# Patient Record
(demographics unavailable — no encounter records)

---

## 2025-03-23 NOTE — SIGNATURES
[TextEntry] : This note was written by Danyelle Hewitt on 03/21/2025 actively solely OWEN Diop M.D 03/21/2025. All medical record entries made by this scribe were at my  OWEN Diop M.D  direction and personally dictated by me on 03/21/2025. I have personally reviewed my chart and agree that the record reflects my personal performance of the history, physical exam, assessment, and plan.

## 2025-03-23 NOTE — HISTORY OF PRESENT ILLNESS
[FreeTextEntry1] : Pt is a 42 year old female who presents today for a follow-up visit. She c/o UTI sxs and abnormal discharge. Pt has a h/o acute UTI and yeast infections. No other gyn co.

## 2025-03-23 NOTE — PLAN
[FreeTextEntry1] : Acute UTI Urine culture sent Rx for Macrobid sent  Vaginal discharge  Rx for fluconazole sent

## 2025-03-23 NOTE — PHYSICAL EXAM
[Appropriately responsive] : appropriately responsive [Alert] : alert [No Acute Distress] : no acute distress [No Lymphadenopathy] : no lymphadenopathy [Soft] : soft [Non-distended] : non-distended [Non-tender] : non-tender [No HSM] : No HSM [No Lesions] : no lesions [No Mass] : no mass [Oriented x3] : oriented x3

## 2025-07-30 NOTE — PHYSICAL EXAM
[MA] : MA [Appropriately responsive] : appropriately responsive [Alert] : alert [No Acute Distress] : no acute distress [No Lymphadenopathy] : no lymphadenopathy [Soft] : soft [Non-tender] : non-tender [Non-distended] : non-distended [No HSM] : No HSM [No Lesions] : no lesions [No Mass] : no mass [Oriented x3] : oriented x3 [Examination Of The Breasts] : a normal appearance [No Masses] : no breast masses were palpable [Labia Majora] : normal [Labia Minora] : normal [Normal] : normal [Uterine Adnexae] : normal [FreeTextEntry2] : MA

## 2025-07-30 NOTE — HISTORY OF PRESENT ILLNESS
[FreeTextEntry1] : 44 y/o female LMP 7/18/25 here for annual exam no complaints thinking about conceiving

## 2025-07-30 NOTE — COUNSELING
[Nutrition/ Exercise/ Weight Management] : nutrition, exercise, weight management [Body Image] : body image [Vitamins/Supplements] : vitamins/supplements [Bladder Hygiene] : bladder hygiene [Contraception/ Emergency Contraception/ Safe Sexual Practices] : contraception, emergency contraception, safe sexual practices [STD (testing, results, tx)] : STD (testing, results, tx) [FreeTextEntry2] :  patient screened for depression - no signs of clinical depression. PHQ-9 scores reviewed over the course of the visit. 5-10 minutes of face to face time. Follow up with changes in mood including other symptoms of anxiety.

## 2025-07-30 NOTE — PLAN
[FreeTextEntry1] : ALBARO is a 43 year old female presenting for well woman exam. Sexually active, monogamous with 1 male partner. No complaints. Reg menses.   HCM pap/hpv mammo/breast sono